# Patient Record
Sex: FEMALE | Race: BLACK OR AFRICAN AMERICAN
[De-identification: names, ages, dates, MRNs, and addresses within clinical notes are randomized per-mention and may not be internally consistent; named-entity substitution may affect disease eponyms.]

---

## 2018-04-06 ENCOUNTER — HOSPITAL ENCOUNTER (OUTPATIENT)
Dept: HOSPITAL 17 - NEPE | Age: 33
LOS: 1 days | Discharge: HOME | End: 2018-04-07
Payer: MEDICARE

## 2018-04-06 VITALS — HEIGHT: 63 IN | BODY MASS INDEX: 42.19 KG/M2 | WEIGHT: 238.1 LBS

## 2018-04-06 VITALS — OXYGEN SATURATION: 99 % | RESPIRATION RATE: 18 BRPM

## 2018-04-06 VITALS
OXYGEN SATURATION: 99 % | DIASTOLIC BLOOD PRESSURE: 68 MMHG | RESPIRATION RATE: 18 BRPM | SYSTOLIC BLOOD PRESSURE: 136 MMHG | HEART RATE: 101 BPM | TEMPERATURE: 98.6 F

## 2018-04-06 DIAGNOSIS — S09.90XA: ICD-10-CM
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
## 2018-04-06 DIAGNOSIS — Z79.84: ICD-10-CM

## 2018-04-06 DIAGNOSIS — I10: ICD-10-CM

## 2018-04-06 DIAGNOSIS — S05.32XA: Primary | ICD-10-CM

## 2018-04-06 DIAGNOSIS — E11.9: ICD-10-CM

## 2018-04-06 PROCEDURE — 96365 THER/PROPH/DIAG IV INF INIT: CPT

## 2018-04-06 PROCEDURE — 70450 CT HEAD/BRAIN W/O DYE: CPT

## 2018-04-06 PROCEDURE — 85610 PROTHROMBIN TIME: CPT

## 2018-04-06 PROCEDURE — 80048 BASIC METABOLIC PNL TOTAL CA: CPT

## 2018-04-06 PROCEDURE — 85730 THROMBOPLASTIN TIME PARTIAL: CPT

## 2018-04-06 PROCEDURE — 82948 REAGENT STRIP/BLOOD GLUCOSE: CPT

## 2018-04-06 PROCEDURE — 90471 IMMUNIZATION ADMIN: CPT

## 2018-04-06 PROCEDURE — 96375 TX/PRO/DX INJ NEW DRUG ADDON: CPT

## 2018-04-06 PROCEDURE — 72131 CT LUMBAR SPINE W/O DYE: CPT

## 2018-04-06 PROCEDURE — 85025 COMPLETE CBC W/AUTO DIFF WBC: CPT

## 2018-04-06 PROCEDURE — 99285 EMERGENCY DEPT VISIT HI MDM: CPT

## 2018-04-06 PROCEDURE — 84703 CHORIONIC GONADOTROPIN ASSAY: CPT

## 2018-04-06 PROCEDURE — 00140 ANES PROCEDURES ON EYE NOS: CPT

## 2018-04-06 PROCEDURE — 90714 TD VACC NO PRESV 7 YRS+ IM: CPT

## 2018-04-06 PROCEDURE — 65285 REPAIR OF EYE WOUND: CPT

## 2018-04-06 PROCEDURE — 70486 CT MAXILLOFACIAL W/O DYE: CPT
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
## 2018-04-07 VITALS
OXYGEN SATURATION: 99 % | DIASTOLIC BLOOD PRESSURE: 59 MMHG | HEART RATE: 70 BPM | SYSTOLIC BLOOD PRESSURE: 131 MMHG | RESPIRATION RATE: 15 BRPM

## 2018-04-07 VITALS
SYSTOLIC BLOOD PRESSURE: 123 MMHG | RESPIRATION RATE: 14 BRPM | DIASTOLIC BLOOD PRESSURE: 61 MMHG | HEART RATE: 69 BPM | OXYGEN SATURATION: 94 % | TEMPERATURE: 98.7 F

## 2018-04-07 LAB
BASOPHILS # BLD AUTO: 0 TH/MM3 (ref 0–0.2)
BASOPHILS NFR BLD: 0.4 % (ref 0–2)
BUN SERPL-MCNC: 7 MG/DL (ref 7–18)
CALCIUM SERPL-MCNC: 8.6 MG/DL (ref 8.5–10.1)
CHLORIDE SERPL-SCNC: 104 MEQ/L (ref 98–107)
CREAT SERPL-MCNC: 0.9 MG/DL (ref 0.5–1)
EOSINOPHIL # BLD: 0.1 TH/MM3 (ref 0–0.4)
EOSINOPHIL NFR BLD: 1.5 % (ref 0–4)
ERYTHROCYTE [DISTWIDTH] IN BLOOD BY AUTOMATED COUNT: 16.9 % (ref 11.6–17.2)
GFR SERPLBLD BASED ON 1.73 SQ M-ARVRAT: 87 ML/MIN (ref 89–?)
GLUCOSE SERPL-MCNC: 131 MG/DL (ref 74–106)
HCO3 BLD-SCNC: 25.7 MEQ/L (ref 21–32)
HCT VFR BLD CALC: 29.3 % (ref 35–46)
HGB BLD-MCNC: 9.3 GM/DL (ref 11.6–15.3)
INR PPP: 1.1 RATIO
LYMPHOCYTES # BLD AUTO: 2.9 TH/MM3 (ref 1–4.8)
LYMPHOCYTES NFR BLD AUTO: 36.7 % (ref 9–44)
MCH RBC QN AUTO: 24.9 PG (ref 27–34)
MCHC RBC AUTO-ENTMCNC: 31.9 % (ref 32–36)
MCV RBC AUTO: 78.1 FL (ref 80–100)
MONOCYTE #: 0.6 TH/MM3 (ref 0–0.9)
MONOCYTES NFR BLD: 7.6 % (ref 0–8)
NEUTROPHILS # BLD AUTO: 4.3 TH/MM3 (ref 1.8–7.7)
NEUTROPHILS NFR BLD AUTO: 53.8 % (ref 16–70)
PLATELET # BLD: 411 TH/MM3 (ref 150–450)
PMV BLD AUTO: 7.4 FL (ref 7–11)
PROTHROMBIN TIME: 10.8 SEC (ref 9.8–11.6)
RBC # BLD AUTO: 3.75 MIL/MM3 (ref 4–5.3)
SODIUM SERPL-SCNC: 139 MEQ/L (ref 136–145)
WBC # BLD AUTO: 8 TH/MM3 (ref 4–11)

## 2018-04-07 NOTE — HHI.PR
Subjective


Remarks


s/p ruptured globe repair.





Objective





Vital Signs








  Date Time  Temp Pulse Resp B/P (MAP) Pulse Ox O2 Delivery O2 Flow Rate FiO2


 


4/7/18 05:10        


 


4/7/18 04:00  70 15 131/59 (83) 99 Room Air  


 


4/6/18 23:23   18  99 Room Air  


 


4/6/18 23:12 98.6 101 18 136/68 (90) 99   








Result Diagram:  


4/7/18 0120                                                                    

            4/6/18 2330








Assessment and Plan


Problem List:  


(1) Ruptured globe of left eye


ICD Codes:  S05.32XA - Ocular laceration without prolapse or loss of 

intraocular tissue, left eye, initial encounter


Status:  Acute


Plan:  Due to dehiscence of corneal transplant. s/p repair. Patient to keep 

patch over eye until she follows up on Monday at 830am in the office. Ok to be 

discharged on Augmentin 875 BID x 10 days and Percocet 5/325 PRN.








Problem Qualifiers





(1) Ruptured globe of left eye:  


Qualified Codes:  S05.32XA - Ocular laceration without prolapse or loss of 

intraocular tissue, left eye, initial encounter








Gaby Gonzalez MD Apr 7, 2018 07:10

## 2018-04-07 NOTE — PD.OP
__________________________________________________





Operative Report


Date of Surgery:  Apr 7, 2018


Preoperative Diagnosis:  


(1) Ruptured globe of left eye


Postoperative Diagnosis:  


(1) Ruptured globe of left eye


Procedure:


repair of ruptured globe left eye


Surgeon:


Gaby Gonzalez


Assistant(s):


none


Operation and Findings:


The patient was consented for surgery and taken back to the operating room. She 

was put under general anesthesia and prepped and draped in a sterile fashion. A 

wire lid speculum was placed in the left eye. A corneal transplant dehiscence 

was seen from 4 o'clock to 11 o'clock. The wound was irrigated with TobraDex 

drops. Ocucoat was placed in the eye to reform the globe. 10-0 interrupted 

nylon sutures were placed to close the corneal wound. The wound was found to be 

watertight. Subconjunctival Decadron and gentamicin was injected at the end of 

the case. An eye patch and shield were placed over the eye. The patient was 

sent to PACU in stable condition.











Gaby Gonzalez MD Apr 7, 2018 07:08
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
## 2018-05-09 ENCOUNTER — HOSPITAL ENCOUNTER (EMERGENCY)
Dept: HOSPITAL 17 - NEPD | Age: 33
Discharge: HOME | End: 2018-05-09
Payer: MEDICARE

## 2018-05-09 VITALS
SYSTOLIC BLOOD PRESSURE: 151 MMHG | OXYGEN SATURATION: 100 % | RESPIRATION RATE: 18 BRPM | TEMPERATURE: 98.9 F | HEART RATE: 70 BPM | DIASTOLIC BLOOD PRESSURE: 82 MMHG

## 2018-05-09 DIAGNOSIS — H54.62: ICD-10-CM

## 2018-05-09 DIAGNOSIS — H57.12: Primary | ICD-10-CM

## 2018-05-09 PROCEDURE — 99283 EMERGENCY DEPT VISIT LOW MDM: CPT
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
